# Patient Record
Sex: FEMALE | Race: WHITE | NOT HISPANIC OR LATINO | Employment: OTHER | URBAN - NONMETROPOLITAN AREA
[De-identification: names, ages, dates, MRNs, and addresses within clinical notes are randomized per-mention and may not be internally consistent; named-entity substitution may affect disease eponyms.]

---

## 2022-04-11 ENCOUNTER — OFFICE VISIT (OUTPATIENT)
Dept: ORTHOPEDIC SURGERY | Facility: CLINIC | Age: 77
End: 2022-04-11

## 2022-04-11 VITALS
BODY MASS INDEX: 26.51 KG/M2 | WEIGHT: 179 LBS | HEIGHT: 69 IN | HEART RATE: 60 BPM | SYSTOLIC BLOOD PRESSURE: 143 MMHG | DIASTOLIC BLOOD PRESSURE: 75 MMHG

## 2022-04-11 DIAGNOSIS — M24.549 CONTRACTURE OF HAND: Primary | ICD-10-CM

## 2022-04-11 PROCEDURE — 99203 OFFICE O/P NEW LOW 30 MIN: CPT | Performed by: PHYSICIAN ASSISTANT

## 2022-04-11 RX ORDER — LEVOTHYROXINE SODIUM 0.03 MG/1
TABLET ORAL
COMMUNITY
Start: 2022-04-07

## 2022-04-11 RX ORDER — DONEPEZIL HYDROCHLORIDE 5 MG/1
TABLET, FILM COATED ORAL
COMMUNITY
Start: 2022-04-06

## 2022-04-11 RX ORDER — PANTOPRAZOLE SODIUM 20 MG/1
TABLET, DELAYED RELEASE ORAL
COMMUNITY
Start: 2022-03-11

## 2022-04-11 RX ORDER — ACARBOSE 50 MG/1
TABLET ORAL
COMMUNITY
Start: 2022-04-06

## 2022-04-11 RX ORDER — ONDANSETRON 4 MG/1
TABLET, FILM COATED ORAL
COMMUNITY
Start: 2022-02-21

## 2022-04-11 RX ORDER — ISOSORBIDE MONONITRATE 30 MG/1
TABLET, EXTENDED RELEASE ORAL
COMMUNITY
Start: 2022-03-17

## 2022-04-11 RX ORDER — ATORVASTATIN CALCIUM 40 MG/1
TABLET, FILM COATED ORAL
COMMUNITY
Start: 2022-04-06

## 2022-04-11 RX ORDER — BUSPIRONE HYDROCHLORIDE 10 MG/1
TABLET ORAL
COMMUNITY
Start: 2022-03-31

## 2022-04-11 RX ORDER — BUMETANIDE 1 MG/1
TABLET ORAL
COMMUNITY
Start: 2022-03-28

## 2022-04-11 RX ORDER — BACLOFEN 10 MG/1
TABLET ORAL
COMMUNITY
Start: 2022-03-14

## 2022-04-11 RX ORDER — CLONIDINE HYDROCHLORIDE 0.3 MG/1
TABLET ORAL
COMMUNITY
Start: 2022-04-06

## 2022-04-11 RX ORDER — LEVETIRACETAM 500 MG/1
TABLET ORAL
COMMUNITY
Start: 2022-04-06

## 2022-04-11 RX ORDER — OXYBUTYNIN CHLORIDE 5 MG/1
TABLET ORAL
COMMUNITY
Start: 2022-03-23

## 2022-04-11 RX ORDER — POTASSIUM CHLORIDE 750 MG/1
TABLET, FILM COATED, EXTENDED RELEASE ORAL
COMMUNITY
Start: 2022-04-06

## 2022-04-11 RX ORDER — RANOLAZINE 500 MG/1
TABLET, EXTENDED RELEASE ORAL
COMMUNITY
Start: 2022-03-16

## 2022-04-11 RX ORDER — SILVER SULFADIAZINE 1 %
CREAM (GRAM) TOPICAL
COMMUNITY
Start: 2022-03-22

## 2022-04-11 RX ORDER — LAMOTRIGINE 200 MG/1
TABLET ORAL
COMMUNITY
Start: 2022-03-31

## 2022-04-11 RX ORDER — INSULIN NPH HUM/REG INSULIN HM 70-30/ML
VIAL (ML) SUBCUTANEOUS
COMMUNITY
Start: 2022-03-09

## 2022-04-11 RX ORDER — CLOPIDOGREL BISULFATE 75 MG/1
TABLET ORAL
COMMUNITY
Start: 2022-03-17

## 2022-04-11 RX ORDER — ALLOPURINOL 300 MG/1
TABLET ORAL
COMMUNITY
Start: 2022-03-18

## 2022-04-11 RX ORDER — NYSTATIN 100000 [USP'U]/G
POWDER TOPICAL
COMMUNITY
Start: 2022-02-12

## 2022-04-11 RX ORDER — LINAGLIPTIN 5 MG/1
TABLET, FILM COATED ORAL
COMMUNITY
Start: 2022-04-05

## 2022-04-11 RX ORDER — METOPROLOL TARTRATE 50 MG/1
TABLET, FILM COATED ORAL
COMMUNITY
Start: 2022-04-06

## 2022-04-11 RX ORDER — INSULIN GLARGINE 100 [IU]/ML
INJECTION, SOLUTION SUBCUTANEOUS
COMMUNITY
Start: 2022-04-02

## 2022-04-11 RX ORDER — NITROGLYCERIN 0.4 MG/1
TABLET SUBLINGUAL
COMMUNITY
Start: 2022-03-05

## 2022-04-19 NOTE — PROGRESS NOTES
[facility] [place] [facility] New Patient Visit          Patient: Melissa Saldana  YOB: 1945  Date of Encounter: 04/11/2022  PCP: Charanjit Philippe MD      Subjective     Chief Complaint   Patient presents with   • Left Hand - Initial Evaluation, Pain           History of Present Illness:     Melissa Saldana is a 77 y.o. 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  Patient reports that at times she will have difficulty upon deep flexion contracture that will cause pressure sores into the palm of the left hand.  Currently she reports that she has had no patient this in terms of skin.  However she is having worsening of the flexion structure which she is unable to passively straighten her hand.  She has no current therapy or bracing.  She is requesting this.  Patient is currently residing at a nursing home and will require skilled occupational therapy impatiently.        There is no problem list on file for this patient.    Past Medical History:   Diagnosis Date   • Diabetes (HCC)    • GERD (gastroesophageal reflux disease)    • High cholesterol    • Hypertension    • Stroke (cerebrum) (HCC)    • Thyroid disease      History reviewed. No pertinent surgical history.  Social History     Occupational History   • Not on file   Tobacco Use   • Smoking status: Never Smoker   • Smokeless tobacco: Never Used   Vaping Use   • Vaping Use: Never used   Substance and Sexual Activity   • Alcohol use: Never   • Drug use: Never   • Sexual activity: Never    Melissa Saldana  reports that she has never smoked. She has never used smokeless tobacco.. I have educated her on the risk of diseases from using tobacco products such as cancer, COPD and heart disease.        Social History     Social History Narrative   • Not on file     History reviewed. No pertinent family history.  Current Outpatient Medications   Medication Sig Dispense Refill   • acarbose  "(PRECOSE) 50 MG tablet      • allopurinol (ZYLOPRIM) 300 MG tablet      • atorvastatin (LIPITOR) 40 MG tablet      • baclofen (LIORESAL) 10 MG tablet      • bumetanide (BUMEX) 1 MG tablet      • busPIRone (BUSPAR) 10 MG tablet      • cloNIDine (CATAPRES) 0.3 MG tablet      • clopidogrel (PLAVIX) 75 MG tablet      • donepezil (ARICEPT) 5 MG tablet      • HumaLOG 100 UNIT/ML injection      • HumuLIN 70/30 (70-30) 100 UNIT/ML injection      • isosorbide mononitrate (IMDUR) 30 MG 24 hr tablet      • lamoTRIgine (LaMICtal) 200 MG tablet      • Lantus 100 UNIT/ML injection      • levETIRAcetam (KEPPRA) 500 MG tablet      • levothyroxine (SYNTHROID, LEVOTHROID) 25 MCG tablet      • metoprolol tartrate (LOPRESSOR) 50 MG tablet      • nitroglycerin (NITROSTAT) 0.4 MG SL tablet      • nystatin 990505 UNIT/GM powder      • ondansetron (ZOFRAN) 4 MG tablet      • oxybutynin (DITROPAN) 5 MG tablet      • pantoprazole (PROTONIX) 20 MG EC tablet      • potassium chloride 10 MEQ CR tablet      • ranolazine (RANEXA) 500 MG 12 hr tablet      • SSD 1 % cream      • Tradjenta 5 MG tablet tablet        No current facility-administered medications for this visit.     No Known Allergies         Review of Systems   Constitutional: Negative.   HENT: Negative.    Eyes: Negative.    Cardiovascular: Negative.    Respiratory: Negative.    Endocrine: Negative.    Hematologic/Lymphatic: Negative.    Skin: Negative.    Musculoskeletal:        Pertinent positives listed in HPI   Gastrointestinal: Negative.    Genitourinary: Negative.    Neurological: Negative.    Psychiatric/Behavioral: Negative.    Allergic/Immunologic: Negative.          Objective      Vitals:    04/11/22 1603   BP: 143/75   Pulse: 60   Weight: 81.2 kg (179 lb)   Height: 175.3 cm (69\")      Patient's Body mass index is 26.43 kg/m². indicating that she is overweight (BMI 25-29.9). Patient's (Body mass index is 26.43 kg/m².) indicates that they are overweight with health conditions " that include listed in PMH . Weight is newly identified. BMI is is above average; BMI management plan is completed. We discussed portion control and increasing exercise.      Physical Exam  Vitals and nursing note reviewed.   Constitutional:       General: She is not in acute distress.     Appearance: She is not ill-appearing.   HENT:      Head: Normocephalic and atraumatic.      Right Ear: External ear normal.      Left Ear: External ear normal.      Nose: Nose normal. No congestion or rhinorrhea.   Eyes:      Extraocular Movements: Extraocular movements intact.      Conjunctiva/sclera: Conjunctivae normal.      Pupils: Pupils are equal, round, and reactive to light.   Cardiovascular:      Rate and Rhythm: Normal rate.      Pulses: Normal pulses.   Pulmonary:      Effort: Pulmonary effort is normal. No respiratory distress.      Breath sounds: No stridor.   Abdominal:      General: There is no distension.   Musculoskeletal:      Cervical back: Normal range of motion.      Comments: Examination today the patient's left hand reveals there is a fixed flexed position of approximately 70 degrees MCP joint level flexion.  Patient has flexion in a closed fist position with remainder the digits.  Passive extension to approximately 60 degrees of MCP and 80 degrees of IP joint.  There is no swelling, ecchymosis or erythema.  There is diffuse muscle wasting from previous CVA of the digits.   Skin:     General: Skin is warm and dry.      Capillary Refill: Capillary refill takes less than 2 seconds.   Neurological:      Mental Status: She is alert and oriented to person, place, and time.   Psychiatric:         Mood and Affect: Mood normal.         Behavior: Behavior normal.         Thought Content: Thought content normal.         Judgment: Judgment normal.             Assessment/Plan        ICD-10-CM ICD-9-CM   1. Contracture of hand  M24.549 718.44       77-year-old female nursing home patient with a prior CVA affecting the  left upper and lower extremities.  Patient has no left hand flexor contracture involving all digits that will occasionally cause skin changes into the palmar aspect of the hand.  As result of this the patient was written for a flexion contracture brace requiring slightly slightly extending the digits and preventing skin complications.  Patient will receive the brace and wear this accordingly and will continue to work with therapy and Occupational Therapy that was written today.  She return back in 4 weeks for further evaluation of the efficacy of the therapy and bracing.                      This document was signed by Natan Meier PA-C April 11, 2022     CC: Charanjit hPilippe MD       EMR Dragon/Transcription disclaimer:  Part of this note may be completed utilizing the dragon speech recognition software. This electronic transcription/translation of spoken language to printed text may contain grammatical errors, random word insertions, pronoun errors, and incomplete sentences or occasional consequences of the system due to software limitations, ambient noise, and hardware issues.  Any questions or concerns about the content, text, or information contained within the body of this dictation should be directly addressed to the physician for clarification.

## 2022-05-23 ENCOUNTER — OFFICE VISIT (OUTPATIENT)
Dept: ORTHOPEDIC SURGERY | Facility: CLINIC | Age: 77
End: 2022-05-23

## 2022-05-23 VITALS — HEIGHT: 69 IN | WEIGHT: 179 LBS | BODY MASS INDEX: 26.51 KG/M2

## 2022-05-23 DIAGNOSIS — M24.549 CONTRACTURE OF HAND: Primary | ICD-10-CM

## 2022-05-23 PROCEDURE — 99213 OFFICE O/P EST LOW 20 MIN: CPT | Performed by: PHYSICIAN ASSISTANT

## 2022-05-23 NOTE — PROGRESS NOTES
[facility] [place] [facility] Established Patient Visit          Patient: Melissa Saldana  YOB: 1945  Date of Encounter: 5/23/2022  PCP: Charanjit Philippe MD      Subjective     Chief Complaint   Patient presents with   • Left Hand - Pain, Follow-up           History of Present Illness:     Melissa Saldana is a 77 y.o. 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  Patient reports that at times she will have difficulty upon deep flexion contracture that will cause pressure sores into the palm of the left hand.  The patient has seen some improvement with occupational therapy however she is still awaiting her brace that was given last office visit.  She states that currently she is not having any pressure sores into the palm of the hand and that she is able to passively straighten the digits more.  Once again, patient is currently residing at a nursing home and will require skilled occupational therapy impatiently.        There is no problem list on file for this patient.    Past Medical History:   Diagnosis Date   • Diabetes (HCC)    • GERD (gastroesophageal reflux disease)    • High cholesterol    • Hypertension    • Stroke (cerebrum) (HCC)    • Thyroid disease      History reviewed. No pertinent surgical history.  Social History     Occupational History   • Not on file   Tobacco Use   • Smoking status: Never Smoker   • Smokeless tobacco: Never Used   Vaping Use   • Vaping Use: Never used   Substance and Sexual Activity   • Alcohol use: Never   • Drug use: Never   • Sexual activity: Never    Melissa Saldana  reports that she has never smoked. She has never used smokeless tobacco.. I have educated her on the risk of diseases from using tobacco products such as cancer, COPD and heart disease.        Social History     Social History Narrative   • Not on file     History reviewed. No pertinent family history.  Current Outpatient  "Medications   Medication Sig Dispense Refill   • acarbose (PRECOSE) 50 MG tablet      • allopurinol (ZYLOPRIM) 300 MG tablet      • atorvastatin (LIPITOR) 40 MG tablet      • baclofen (LIORESAL) 10 MG tablet      • bumetanide (BUMEX) 1 MG tablet      • busPIRone (BUSPAR) 10 MG tablet      • cloNIDine (CATAPRES) 0.3 MG tablet      • clopidogrel (PLAVIX) 75 MG tablet      • donepezil (ARICEPT) 5 MG tablet      • HumaLOG 100 UNIT/ML injection      • HumuLIN 70/30 (70-30) 100 UNIT/ML injection      • isosorbide mononitrate (IMDUR) 30 MG 24 hr tablet      • lamoTRIgine (LaMICtal) 200 MG tablet      • Lantus 100 UNIT/ML injection      • levETIRAcetam (KEPPRA) 500 MG tablet      • levothyroxine (SYNTHROID, LEVOTHROID) 25 MCG tablet      • metoprolol tartrate (LOPRESSOR) 50 MG tablet      • nitroglycerin (NITROSTAT) 0.4 MG SL tablet      • nystatin 778485 UNIT/GM powder      • ondansetron (ZOFRAN) 4 MG tablet      • oxybutynin (DITROPAN) 5 MG tablet      • pantoprazole (PROTONIX) 20 MG EC tablet      • potassium chloride 10 MEQ CR tablet      • ranolazine (RANEXA) 500 MG 12 hr tablet      • SSD 1 % cream      • Tradjenta 5 MG tablet tablet        No current facility-administered medications for this visit.     No Known Allergies         Review of Systems   Constitutional: Negative.   HENT: Negative.    Eyes: Negative.    Cardiovascular: Negative.    Respiratory: Negative.    Endocrine: Negative.    Hematologic/Lymphatic: Negative.    Skin: Negative.    Musculoskeletal:        Pertinent positives listed in HPI   Gastrointestinal: Negative.    Genitourinary: Negative.    Neurological: Negative.    Psychiatric/Behavioral: Negative.    Allergic/Immunologic: Negative.          Objective      Vitals:    05/23/22 1501   Weight: 81.2 kg (179 lb)   Height: 175.3 cm (69\")      Patient's Body mass index is 26.43 kg/m². indicating that she is overweight (BMI 25-29.9). Patient's (Body mass index is 26.43 kg/m².) indicates that they are " overweight with health conditions that include listed in PMH . Weight is newly identified. BMI is is above average; BMI management plan is completed. We discussed portion control and increasing exercise.      Physical Exam  Vitals and nursing note reviewed.   Constitutional:       General: She is not in acute distress.     Appearance: She is not ill-appearing.   HENT:      Head: Normocephalic and atraumatic.      Right Ear: External ear normal.      Left Ear: External ear normal.      Nose: Nose normal. No congestion or rhinorrhea.   Eyes:      Extraocular Movements: Extraocular movements intact.      Conjunctiva/sclera: Conjunctivae normal.      Pupils: Pupils are equal, round, and reactive to light.   Cardiovascular:      Rate and Rhythm: Normal rate.      Pulses: Normal pulses.   Pulmonary:      Effort: Pulmonary effort is normal. No respiratory distress.      Breath sounds: No stridor.   Abdominal:      General: There is no distension.   Musculoskeletal:      Cervical back: Normal range of motion.      Comments: Examination today the patient's left hand reveals there is a fixed flexed position of approximately 60 degrees MCP joint level flexion.  Patient has flexion in a closed fist position with remainder the digits.  Passive extension to approximately 50 degrees of MCP and 90 degrees of IP joint.  There is no swelling, ecchymosis or erythema.  There is diffuse muscle wasting from previous CVA of the digits.   Skin:     General: Skin is warm and dry.      Capillary Refill: Capillary refill takes less than 2 seconds.   Neurological:      Mental Status: She is alert and oriented to person, place, and time.   Psychiatric:         Mood and Affect: Mood normal.         Behavior: Behavior normal.         Thought Content: Thought content normal.         Judgment: Judgment normal.             Assessment/Plan        ICD-10-CM ICD-9-CM   1. Contracture of hand  M24.549 718.44       77-year-old female nursing home patient  with a prior CVA affecting the left upper and lower extremities.  Patient has left hand flexor contracture involving all digits that will occasionally cause skin changes into the palmar aspect of the hand.  As result of this the patient's prescription was faxed to [facility] for flexion contracture brace requiring slightly slightly extending the digits and preventing skin complications.  Patient will receive the brace and wear this accordingly and will continue to work with therapy and Occupational Therapy.  She return back in 4 weeks for further evaluation of the efficacy of the therapy and bracing.                      This document was signed by Natan Meier PA-C May 23, 2022     CC: Charanjit Philippe MD       EMR Dragon/Transcription disclaimer:  Part of this note may be completed utilizing the dragon speech recognition software. This electronic transcription/translation of spoken language to printed text may contain grammatical errors, random word insertions, pronoun errors, and incomplete sentences or occasional consequences of the system due to software limitations, ambient noise, and hardware issues.  Any questions or concerns about the content, text, or information contained within the body of this dictation should be directly addressed to the physician for clarification.